# Patient Record
Sex: OTHER/UNKNOWN | URBAN - METROPOLITAN AREA
[De-identification: names, ages, dates, MRNs, and addresses within clinical notes are randomized per-mention and may not be internally consistent; named-entity substitution may affect disease eponyms.]

---
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
## 2021-04-06 ENCOUNTER — RX ONLY (RX ONLY)
Age: 21
End: 2021-04-06

## 2021-04-06 RX ORDER — CLINDAMYCIN PHOSPHATE 10 MG/1
1% SWAB TOPICAL QAM
Qty: 1 | Refills: 1 | Status: ERX

## 2021-04-06 RX ORDER — CLINDAMYCIN PHOSPHATE AND BENZOYL PEROXIDE 10; 37.5 MG/G; MG/G
GEL TOPICAL QPM
Qty: 1 | Refills: 1 | Status: ERX